# Patient Record
Sex: MALE | Race: BLACK OR AFRICAN AMERICAN | Employment: UNEMPLOYED | URBAN - METROPOLITAN AREA
[De-identification: names, ages, dates, MRNs, and addresses within clinical notes are randomized per-mention and may not be internally consistent; named-entity substitution may affect disease eponyms.]

---

## 2019-03-26 ENCOUNTER — HOSPITAL ENCOUNTER (EMERGENCY)
Age: 19
Discharge: HOME | End: 2019-03-26
Attending: EMERGENCY MEDICINE
Payer: COMMERCIAL

## 2019-03-26 ENCOUNTER — APPOINTMENT (OUTPATIENT)
Dept: GENERAL RADIOLOGY | Age: 19
End: 2019-03-26
Payer: COMMERCIAL

## 2019-03-26 VITALS
WEIGHT: 111 LBS | HEART RATE: 59 BPM | DIASTOLIC BLOOD PRESSURE: 50 MMHG | HEIGHT: 68 IN | RESPIRATION RATE: 18 BRPM | BODY MASS INDEX: 16.82 KG/M2 | SYSTOLIC BLOOD PRESSURE: 103 MMHG | TEMPERATURE: 98.6 F | OXYGEN SATURATION: 97 %

## 2019-03-26 DIAGNOSIS — S46.912A STRAIN OF LEFT SHOULDER, INITIAL ENCOUNTER: Primary | ICD-10-CM

## 2019-03-26 PROCEDURE — 73030 X-RAY EXAM OF SHOULDER: CPT

## 2019-03-26 PROCEDURE — 71045 X-RAY EXAM CHEST 1 VIEW: CPT

## 2019-03-26 PROCEDURE — 99283 EMERGENCY DEPT VISIT LOW MDM: CPT

## 2019-03-26 RX ORDER — NAPROXEN 500 MG/1
500 TABLET ORAL 2 TIMES DAILY
Qty: 30 TABLET | Refills: 0 | Status: SHIPPED | OUTPATIENT
Start: 2019-03-26 | End: 2019-04-10

## 2019-03-26 SDOH — HEALTH STABILITY: MENTAL HEALTH: HOW OFTEN DO YOU HAVE A DRINK CONTAINING ALCOHOL?: NEVER

## 2019-03-26 ASSESSMENT — PAIN DESCRIPTION - DESCRIPTORS: DESCRIPTORS: SHARP

## 2019-03-26 ASSESSMENT — PAIN SCALES - GENERAL: PAINLEVEL_OUTOF10: 8

## 2019-03-26 ASSESSMENT — PAIN DESCRIPTION - LOCATION: LOCATION: SHOULDER

## 2019-03-26 ASSESSMENT — PAIN DESCRIPTION - ORIENTATION: ORIENTATION: LEFT

## 2021-01-09 ENCOUNTER — HOSPITAL ENCOUNTER (EMERGENCY)
Age: 21
Discharge: HOME | End: 2021-01-09
Payer: COMMERCIAL

## 2021-01-09 VITALS
SYSTOLIC BLOOD PRESSURE: 96 MMHG | DIASTOLIC BLOOD PRESSURE: 64 MMHG | TEMPERATURE: 97 F | OXYGEN SATURATION: 99 % | HEART RATE: 75 BPM | RESPIRATION RATE: 18 BRPM

## 2021-01-09 DIAGNOSIS — J02.0 STREP PHARYNGITIS: Primary | ICD-10-CM

## 2021-01-09 LAB — STREP GRP A PCR: POSITIVE

## 2021-01-09 PROCEDURE — 99283 EMERGENCY DEPT VISIT LOW MDM: CPT

## 2021-01-09 PROCEDURE — 6370000000 HC RX 637 (ALT 250 FOR IP): Performed by: NURSE PRACTITIONER

## 2021-01-09 PROCEDURE — 87880 STREP A ASSAY W/OPTIC: CPT

## 2021-01-09 RX ORDER — PREDNISONE 20 MG/1
60 TABLET ORAL ONCE
Status: COMPLETED | OUTPATIENT
Start: 2021-01-09 | End: 2021-01-09

## 2021-01-09 RX ORDER — IBUPROFEN 800 MG/1
800 TABLET ORAL ONCE
Status: COMPLETED | OUTPATIENT
Start: 2021-01-09 | End: 2021-01-09

## 2021-01-09 RX ORDER — PREDNISONE 10 MG/1
TABLET ORAL
Qty: 20 TABLET | Refills: 0 | Status: SHIPPED | OUTPATIENT
Start: 2021-01-09 | End: 2021-01-19

## 2021-01-09 RX ORDER — AMOXICILLIN 250 MG/1
500 CAPSULE ORAL ONCE
Status: COMPLETED | OUTPATIENT
Start: 2021-01-09 | End: 2021-01-09

## 2021-01-09 RX ORDER — AMOXICILLIN 500 MG/1
500 CAPSULE ORAL 2 TIMES DAILY
Qty: 30 CAPSULE | Refills: 0 | Status: SHIPPED | OUTPATIENT
Start: 2021-01-09 | End: 2021-01-19

## 2021-01-09 RX ADMIN — IBUPROFEN 800 MG: 800 TABLET, FILM COATED ORAL at 21:44

## 2021-01-09 RX ADMIN — PREDNISONE 60 MG: 20 TABLET ORAL at 21:43

## 2021-01-09 RX ADMIN — AMOXICILLIN 500 MG: 250 CAPSULE ORAL at 21:44

## 2021-01-09 ASSESSMENT — PAIN SCALES - GENERAL: PAINLEVEL_OUTOF10: 7

## 2021-01-10 NOTE — ED PROVIDER NOTES
Independent   HPI:  1/9/21, Time: 9:00 PM TATE Mohr is a 23 y.o. male presenting to the ED for 3-day history of sore throat. Patient reports that her last 3 days he has had a sore throat as well as subjective chills. States that he was not exposed to anyone with strep that he is aware of. He denies taking any Motrin or Tylenol to assist with pain relief. Denies any throat tightening or drooling or change noted in voice quality. Patient reports that he does smoke. Patient otherwise denies chest pain, shortness of breath, abdominal pain as well as no noted nausea, vomiting or diarrhea  Review of Systems:   A complete review of systems was performed and pertinent positives and negatives are stated within HPI, all other systems reviewed and are negative.          --------------------------------------------- PAST HISTORY ---------------------------------------------  Past Medical History:  has no past medical history on file. Past Surgical History:  has no past surgical history on file. Social History:  reports that he has been smoking cigarettes. He has never used smokeless tobacco. He reports that he does not drink alcohol or use drugs. Family History: family history is not on file. The patients home medications have been reviewed. Allergies: Patient has no known allergies.     -------------------------------------------------- RESULTS -------------------------------------------------  All laboratory and radiology results have been personally reviewed by myself   LABS:  Results for orders placed or performed during the hospital encounter of 01/09/21   Strep Screen Group A Throat    Specimen: Throat   Result Value Ref Range    Strep Grp A PCR POSITIVE Negative       RADIOLOGY:  Interpreted by Radiologist.  No orders to display       ------------------------- NURSING NOTES AND VITALS REVIEWED ---------------------------   The nursing notes within the ED encounter and vital signs as providing specific details for the plan of care and counseling regarding the diagnosis and prognosis. Questions are answered at this time and they are agreeable with the plan.      --------------------------------- IMPRESSION AND DISPOSITION ---------------------------------    IMPRESSION  1. Strep pharyngitis        DISPOSITION  Disposition: Discharge to home  Patient condition is good      NOTE: This report was transcribed using voice recognition software.  Every effort was made to ensure accuracy; however, inadvertent computerized transcription errors may be present     Ramona Bernstein, HOLA - CNP  01/10/21 4394  ATTENDING PROVIDER ATTESTATION:     Supervising Physician, on-site, available for consultation, non-participatory in the evaluation or care of this patient       Sheryle Bison, MD  01/10/21 1972

## 2021-03-23 ENCOUNTER — HOSPITAL ENCOUNTER (EMERGENCY)
Age: 21
Discharge: HOME | End: 2021-03-23
Payer: COMMERCIAL

## 2021-03-23 VITALS
RESPIRATION RATE: 18 BRPM | OXYGEN SATURATION: 99 % | HEART RATE: 76 BPM | DIASTOLIC BLOOD PRESSURE: 58 MMHG | SYSTOLIC BLOOD PRESSURE: 126 MMHG | TEMPERATURE: 98.2 F

## 2021-03-23 DIAGNOSIS — J02.9 ACUTE PHARYNGITIS, UNSPECIFIED ETIOLOGY: Primary | ICD-10-CM

## 2021-03-23 LAB — STREP GRP A PCR: NEGATIVE

## 2021-03-23 PROCEDURE — 6360000002 HC RX W HCPCS: Performed by: NURSE PRACTITIONER

## 2021-03-23 PROCEDURE — 6370000000 HC RX 637 (ALT 250 FOR IP): Performed by: NURSE PRACTITIONER

## 2021-03-23 PROCEDURE — 87880 STREP A ASSAY W/OPTIC: CPT

## 2021-03-23 PROCEDURE — 99283 EMERGENCY DEPT VISIT LOW MDM: CPT

## 2021-03-23 PROCEDURE — 96372 THER/PROPH/DIAG INJ SC/IM: CPT

## 2021-03-23 RX ORDER — IBUPROFEN 800 MG/1
800 TABLET ORAL 2 TIMES DAILY PRN
Qty: 10 TABLET | Refills: 0 | Status: SHIPPED | OUTPATIENT
Start: 2021-03-23 | End: 2021-03-28

## 2021-03-23 RX ORDER — KETOROLAC TROMETHAMINE 30 MG/ML
30 INJECTION, SOLUTION INTRAMUSCULAR; INTRAVENOUS ONCE
Status: COMPLETED | OUTPATIENT
Start: 2021-03-23 | End: 2021-03-23

## 2021-03-23 RX ORDER — AMOXICILLIN 250 MG/1
500 CAPSULE ORAL ONCE
Status: COMPLETED | OUTPATIENT
Start: 2021-03-23 | End: 2021-03-23

## 2021-03-23 RX ORDER — AMOXICILLIN 500 MG/1
500 CAPSULE ORAL 2 TIMES DAILY
Qty: 20 CAPSULE | Refills: 0 | Status: SHIPPED | OUTPATIENT
Start: 2021-03-23 | End: 2021-04-02

## 2021-03-23 RX ADMIN — KETOROLAC TROMETHAMINE 30 MG: 30 INJECTION, SOLUTION INTRAMUSCULAR at 15:11

## 2021-03-23 RX ADMIN — AMOXICILLIN 500 MG: 250 CAPSULE ORAL at 16:06

## 2021-03-23 ASSESSMENT — PAIN SCALES - GENERAL: PAINLEVEL_OUTOF10: 6
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
## 2021-04-16 ENCOUNTER — HOSPITAL ENCOUNTER (EMERGENCY)
Age: 21
Discharge: HOME | End: 2021-04-16
Payer: COMMERCIAL

## 2021-04-16 VITALS
TEMPERATURE: 97.3 F | OXYGEN SATURATION: 98 % | DIASTOLIC BLOOD PRESSURE: 59 MMHG | SYSTOLIC BLOOD PRESSURE: 113 MMHG | HEIGHT: 69 IN | WEIGHT: 115 LBS | BODY MASS INDEX: 17.03 KG/M2 | RESPIRATION RATE: 16 BRPM | HEART RATE: 82 BPM

## 2021-04-16 DIAGNOSIS — J02.9 ACUTE PHARYNGITIS, UNSPECIFIED ETIOLOGY: Primary | ICD-10-CM

## 2021-04-16 PROCEDURE — 99283 EMERGENCY DEPT VISIT LOW MDM: CPT

## 2021-04-16 PROCEDURE — 6370000000 HC RX 637 (ALT 250 FOR IP): Performed by: NURSE PRACTITIONER

## 2021-04-16 RX ORDER — CEFDINIR 300 MG/1
300 CAPSULE ORAL 2 TIMES DAILY
Qty: 20 CAPSULE | Refills: 0 | Status: SHIPPED | OUTPATIENT
Start: 2021-04-16 | End: 2021-04-26

## 2021-04-16 RX ORDER — CEFDINIR 300 MG/1
300 CAPSULE ORAL ONCE
Status: COMPLETED | OUTPATIENT
Start: 2021-04-16 | End: 2021-04-16

## 2021-04-16 RX ADMIN — CEFDINIR 300 MG: 300 CAPSULE ORAL at 22:11

## 2021-04-16 ASSESSMENT — PAIN DESCRIPTION - DESCRIPTORS: DESCRIPTORS: SORE

## 2021-04-16 ASSESSMENT — PAIN DESCRIPTION - LOCATION: LOCATION: THROAT

## 2021-04-17 NOTE — ED PROVIDER NOTES
[address]  Department of Emergency Medicine   ED  Encounter Note  Admit Date/RoomTime: 2021  9:48 PM  ED Room: [facility]      NAME: Crystal Wilkerson  : 2000  MRN: [id number]     Chief Complaint:  Pharyngitis (X 1 week. )    History of Present Illness      Crystal Wilkerson is a 23 y.o. old male who presents to the emergency department by private vehicle, for persistent of bilateral sore throat pain, which occured 1 week(s) prior to arrival. Associated Signs & Symptoms: None Since onset the symptoms have been persistent. He is drinking plenty of fluids. Patient states he has had reoccurring episodes of strep pharyngitis this year. He states when he has had strep throat he is taking his antibiotics and his symptoms improved. States nothing makes his symptoms better or worse. He denies headache, fever, chills, chest pain, shortness of breath, cough, abdominal pain, nausea, vomiting, diarrhea, constipation, dysuria, rash. Exposed To: Streptococcus: unknown. Infectious Mononucleosis:  unknown. Symptoms:  Pain:  Yes. Muffled Voice:  No.                            Hoarse:  No.                            Difficulty with Secretions:  No.    Centor Score (MODIFIED) For Strep Pharyngitis:    Age 3-14 Years   no (0)     Age >44 Years   NO     Exudate or Swelling on Tonsils   yes (1)     Tender/Swollen Anterior Cervical Nodes   yes (1)     Fever? (T > 38°C, 100.4°F)   no (0)     Absence of Cough   yes (1)   TOTAL POINTS   3   Score of Zero = Probability of Strep Pharyngitis: 1% - 2.5%. ,   No further testing nor antibiotics. Score of 1 = Probability of Strep Pharyngitis: 5% - 10%. ,   No further testing nor antibiotics. Score of 2 = Probability of Strep Phar: 11% - 17%. Culture/test all, ATB's only for positive culture results. Score of 3 = Probability of Strep Phar: 28% - 35%.    Culture/test all, ATB's only for positive culture results  Score of 4 or 5 = Probability of Strep Pharyngitis: 51% - 53%. Treat empirically with antibiotics. ROS   Pertinent positives and negatives are stated within HPI, all other systems reviewed and are negative. Past Medical History:  has no past medical history on file. Surgical History:  has no past surgical history on file. Social History:  reports that he has been smoking cigarettes. He has never used smokeless tobacco. He reports that he does not drink alcohol or use drugs. Family History: family history is not on file. Allergies: Patient has no known allergies. Physical Exam   Oxygen Saturation Interpretation: Normal.        ED Triage Vitals   BP Temp Temp src Pulse Resp SpO2 Height Weight   04/16/21 2148 04/16/21 2142 -- 04/16/21 2142 04/16/21 2148 04/16/21 2142 04/16/21 2147 04/16/21 2147   (!) 113/59 97.3 °F (36.3 °C)  82 16 98 % 5' 9\" (1.753 m) 115 lb (52.2 kg)         Constitutional:  Alert, development consistent with age. .  Ears:  TMs without perforation, injection, or bulging. External canals clear without exudate. Throat: Airway Patent. moderate erythema, tonsillar hypertrophy 2+ and no exudates. Neck/Lymphatic:  Supple. There is Bilateral  anterior cervical node tenderness. respiratory:  Clear to auscultation and breath sounds equal.    CV: Regular rate and rhythm, normal heart sounds, without pathological murmurs, ectopy, gallops, or rubs. GI:  Abdomen Soft, nontender, good bowel sounds. No firm or pulsatile mass. Inegument:  No rashes, erythema present. Neurological:  Oriented. Motor functions intact. Lab / Imaging Results   (All laboratory and radiology results have been personally reviewed by myself)  Labs:  No results found for this visit on 04/16/21. Imaging: All Radiology results interpreted by Radiologist unless otherwise noted.   No orders to display       ED Course / Medical Decision Making     Medications cefdinir (OMNICEF) capsule 300 mg (300 mg Oral Given 4/16/21 2211)        Consult(s):   None    Procedure(s):   none    MDM:   Based on high suspicion for Strep as per history/physical findings, Rapid Strep/Throat Culture testing was not done  Pharyngitis is likely to  be Strep. Antibiotics are indicated at this time based on clinical presentation and physical findings. Not hypoxic, nothing to suggest pneumonia. Patient is well appearing, non toxic and appropriate for outpatient management. Plan of Care: Normal progression of disease discussed. All questions answered. Instruction provided in the use of fluids, vaporizer, acetaminophen, and other OTC medication for symptom control. Extra fluids  Analgesics as needed, dose reviewed. Follow up as needed should symptoms fail to improve. Prescription for Omnicef     Counseling:  I reviewed today's visit with the patient in addition to providing specific details for the plan of care and counseling regarding the diagnosis and prognosis. Questions are answered at this time and are agreeable with the plan. Assessment     1. Acute pharyngitis, unspecified etiology      Plan   Discharge home. Patient condition is good    New Medications     New Prescriptions    CEFDINIR (OMNICEF) 300 MG CAPSULE    Take 1 capsule by mouth 2 times daily for 10 days     Electronically signed by HOLA Toth CNP   DD: 4/16/21  **This report was transcribed using voice recognition software. Every effort was made to ensure accuracy; however, inadvertent computerized transcription errors may be present.   END OF ED PROVIDER NOTE       HOLA Guillen CNP  04/16/21 5909

## 2022-05-21 ENCOUNTER — APPOINTMENT (OUTPATIENT)
Dept: GENERAL RADIOLOGY | Age: 22
End: 2022-05-21
Payer: COMMERCIAL

## 2022-05-21 ENCOUNTER — HOSPITAL ENCOUNTER (EMERGENCY)
Age: 22
Discharge: HOME | End: 2022-05-21
Attending: EMERGENCY MEDICINE
Payer: COMMERCIAL

## 2022-05-21 VITALS
HEART RATE: 84 BPM | TEMPERATURE: 97.1 F | BODY MASS INDEX: 16.67 KG/M2 | OXYGEN SATURATION: 97 % | HEIGHT: 68 IN | RESPIRATION RATE: 16 BRPM | DIASTOLIC BLOOD PRESSURE: 78 MMHG | WEIGHT: 110 LBS | SYSTOLIC BLOOD PRESSURE: 126 MMHG

## 2022-05-21 DIAGNOSIS — R05.9 COUGH: ICD-10-CM

## 2022-05-21 DIAGNOSIS — J45.901 MODERATE ASTHMA WITH EXACERBATION, UNSPECIFIED WHETHER PERSISTENT: Primary | ICD-10-CM

## 2022-05-21 LAB
ALBUMIN SERPL-MCNC: 4.6 G/DL (ref 3.5–5.2)
ALP BLD-CCNC: 105 U/L (ref 40–129)
ALT SERPL-CCNC: 167 U/L (ref 0–40)
ANION GAP SERPL CALCULATED.3IONS-SCNC: 10 MMOL/L (ref 7–16)
AST SERPL-CCNC: 117 U/L (ref 0–39)
BASOPHILS ABSOLUTE: 0.02 E9/L (ref 0–0.2)
BASOPHILS RELATIVE PERCENT: 0.2 % (ref 0–2)
BILIRUB SERPL-MCNC: 0.6 MG/DL (ref 0–1.2)
BUN BLDV-MCNC: 8 MG/DL (ref 6–20)
CALCIUM SERPL-MCNC: 9.5 MG/DL (ref 8.6–10.2)
CHLORIDE BLD-SCNC: 101 MMOL/L (ref 98–107)
CO2: 29 MMOL/L (ref 22–29)
CREAT SERPL-MCNC: 0.9 MG/DL (ref 0.7–1.2)
EOSINOPHILS ABSOLUTE: 0.33 E9/L (ref 0.05–0.5)
EOSINOPHILS RELATIVE PERCENT: 3.1 % (ref 0–6)
GFR AFRICAN AMERICAN: >60
GFR NON-AFRICAN AMERICAN: >60 ML/MIN/1.73
GLUCOSE BLD-MCNC: 93 MG/DL (ref 74–99)
HCT VFR BLD CALC: 44 % (ref 37–54)
HEMOGLOBIN: 14.6 G/DL (ref 12.5–16.5)
IMMATURE GRANULOCYTES #: 0.04 E9/L
IMMATURE GRANULOCYTES %: 0.4 % (ref 0–5)
LYMPHOCYTES ABSOLUTE: 5.72 E9/L (ref 1.5–4)
LYMPHOCYTES RELATIVE PERCENT: 54.4 % (ref 20–42)
MCH RBC QN AUTO: 29.2 PG (ref 26–35)
MCHC RBC AUTO-ENTMCNC: 33.2 % (ref 32–34.5)
MCV RBC AUTO: 88 FL (ref 80–99.9)
MONOCYTES ABSOLUTE: 0.56 E9/L (ref 0.1–0.95)
MONOCYTES RELATIVE PERCENT: 5.3 % (ref 2–12)
NEUTROPHILS ABSOLUTE: 3.84 E9/L (ref 1.8–7.3)
NEUTROPHILS RELATIVE PERCENT: 36.6 % (ref 43–80)
PDW BLD-RTO: 13.8 FL (ref 11.5–15)
PLATELET # BLD: 265 E9/L (ref 130–450)
PMV BLD AUTO: 9.8 FL (ref 7–12)
POTASSIUM SERPL-SCNC: 4 MMOL/L (ref 3.5–5)
RBC # BLD: 5 E12/L (ref 3.8–5.8)
SODIUM BLD-SCNC: 140 MMOL/L (ref 132–146)
TOTAL PROTEIN: 7.6 G/DL (ref 6.4–8.3)
TROPONIN, HIGH SENSITIVITY: <6 NG/L (ref 0–11)
WBC # BLD: 10.5 E9/L (ref 4.5–11.5)

## 2022-05-21 PROCEDURE — 93005 ELECTROCARDIOGRAM TRACING: CPT | Performed by: NURSE PRACTITIONER

## 2022-05-21 PROCEDURE — 85025 COMPLETE CBC W/AUTO DIFF WBC: CPT

## 2022-05-21 PROCEDURE — 96374 THER/PROPH/DIAG INJ IV PUSH: CPT

## 2022-05-21 PROCEDURE — 94664 DEMO&/EVAL PT USE INHALER: CPT

## 2022-05-21 PROCEDURE — 80053 COMPREHEN METABOLIC PANEL: CPT

## 2022-05-21 PROCEDURE — 84484 ASSAY OF TROPONIN QUANT: CPT

## 2022-05-21 PROCEDURE — 71045 X-RAY EXAM CHEST 1 VIEW: CPT

## 2022-05-21 PROCEDURE — 2580000003 HC RX 258: Performed by: EMERGENCY MEDICINE

## 2022-05-21 PROCEDURE — 6360000002 HC RX W HCPCS: Performed by: NURSE PRACTITIONER

## 2022-05-21 PROCEDURE — 99285 EMERGENCY DEPT VISIT HI MDM: CPT

## 2022-05-21 PROCEDURE — 6370000000 HC RX 637 (ALT 250 FOR IP): Performed by: NURSE PRACTITIONER

## 2022-05-21 RX ORDER — METHYLPREDNISOLONE 4 MG/1
TABLET ORAL
Qty: 21 TABLET | Refills: 0 | Status: SHIPPED | OUTPATIENT
Start: 2022-05-21 | End: 2022-05-27

## 2022-05-21 RX ORDER — 0.9 % SODIUM CHLORIDE 0.9 %
1000 INTRAVENOUS SOLUTION INTRAVENOUS ONCE
Status: COMPLETED | OUTPATIENT
Start: 2022-05-21 | End: 2022-05-21

## 2022-05-21 RX ORDER — IPRATROPIUM BROMIDE AND ALBUTEROL SULFATE 2.5; .5 MG/3ML; MG/3ML
3 SOLUTION RESPIRATORY (INHALATION) ONCE
Status: COMPLETED | OUTPATIENT
Start: 2022-05-21 | End: 2022-05-21

## 2022-05-21 RX ORDER — METHYLPREDNISOLONE SODIUM SUCCINATE 125 MG/2ML
125 INJECTION, POWDER, LYOPHILIZED, FOR SOLUTION INTRAMUSCULAR; INTRAVENOUS ONCE
Status: COMPLETED | OUTPATIENT
Start: 2022-05-21 | End: 2022-05-21

## 2022-05-21 RX ORDER — DOXYCYCLINE HYCLATE 100 MG
100 TABLET ORAL 2 TIMES DAILY
Qty: 20 TABLET | Refills: 0 | Status: SHIPPED | OUTPATIENT
Start: 2022-05-21 | End: 2022-05-31

## 2022-05-21 RX ORDER — ALBUTEROL SULFATE 90 UG/1
2 AEROSOL, METERED RESPIRATORY (INHALATION) EVERY 4 HOURS PRN
Qty: 18 G | Refills: 1 | Status: SHIPPED | OUTPATIENT
Start: 2022-05-21 | End: 2023-05-21

## 2022-05-21 RX ADMIN — IPRATROPIUM BROMIDE AND ALBUTEROL SULFATE 3 AMPULE: 2.5; .5 SOLUTION RESPIRATORY (INHALATION) at 03:29

## 2022-05-21 RX ADMIN — SODIUM CHLORIDE 1000 ML: 9 INJECTION, SOLUTION INTRAVENOUS at 05:29

## 2022-05-21 RX ADMIN — METHYLPREDNISOLONE SODIUM SUCCINATE 125 MG: 125 INJECTION, POWDER, FOR SOLUTION INTRAMUSCULAR; INTRAVENOUS at 03:42

## 2022-05-21 ASSESSMENT — PAIN DESCRIPTION - ORIENTATION: ORIENTATION: MID

## 2022-05-21 ASSESSMENT — PAIN SCALES - GENERAL: PAINLEVEL_OUTOF10: 7

## 2022-05-21 ASSESSMENT — PAIN DESCRIPTION - ONSET: ONSET: SUDDEN

## 2022-05-21 ASSESSMENT — PAIN DESCRIPTION - FREQUENCY: FREQUENCY: CONTINUOUS

## 2022-05-21 ASSESSMENT — PAIN DESCRIPTION - DESCRIPTORS: DESCRIPTORS: SHARP

## 2022-05-21 ASSESSMENT — PAIN DESCRIPTION - PAIN TYPE: TYPE: ACUTE PAIN

## 2022-05-21 ASSESSMENT — PAIN - FUNCTIONAL ASSESSMENT: PAIN_FUNCTIONAL_ASSESSMENT: 0-10

## 2022-05-21 ASSESSMENT — PAIN DESCRIPTION - LOCATION: LOCATION: CHEST
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
## 2022-05-21 NOTE — ED PROVIDER NOTES
HPI:  5/21/22, Time: 3:38 AM EDT         Francoise Andrew is a 19 y.o. male presenting to the ED for shortness of breath, beginning hours ago. The complaint has been persistent, moderate in severity, and worsened by nothing. Patient reporting feeling short of breath and reports chest discomfort. Patient reporting cough for the last 4 days. Patient reports nonproductive patient reporting no fever. Patient reporting no runny nose. There is no abdominal pain or vomiting there is no leg pain. Patient reports history of asthma. Patient reporting no weakness or dizziness. Patient reporting no headache. ROS:   Pertinent positives and negatives are stated within HPI, all other systems reviewed and are negative.  --------------------------------------------- PAST HISTORY ---------------------------------------------  Past Medical History:  has no past medical history on file. Past Surgical History:  has no past surgical history on file. Social History:  reports that he has been smoking cigarettes. He has never used smokeless tobacco. He reports that he does not drink alcohol and does not use drugs. Family History: family history is not on file. The patients home medications have been reviewed. Allergies: Patient has no known allergies. ---------------------------------------------------PHYSICAL EXAM--------------------------------------    Constitutional/General: Alert and oriented x3, well appearing, non toxic in NAD  Head: Normocephalic and atraumatic  Eyes: PERRL, EOMI  Mouth: Oropharynx clear, handling secretions, no trismus  Neck: Supple, full ROM, non tender to palpation in the midline, no stridor, no crepitus, no meningeal signs  Pulmonary: Lungs diminished  breath sounds bilaterally mild distress  Cardiovascular:  Regular rate. Regular rhythm. No murmurs, gallops, or rubs. 2+ distal pulses  Chest: no chest wall tenderness  Abdomen: Soft. Non tender. Non distended. +BS.   No rebound, guarding, or rigidity. No pulsatile masses appreciated. Musculoskeletal: Moves all extremities x 4. Warm and well perfused, no clubbing, cyanosis, or edema. Capillary refill <3 seconds  Skin: warm and dry. No rashes. Neurologic: GCS 15, CN 2-12 grossly intact, no focal deficits, symmetric strength 5/5 in the upper and lower extremities bilaterally  Psych: Normal Affect    -------------------------------------------------- RESULTS -------------------------------------------------  I have personally reviewed all laboratory and imaging results for this patient. Results are listed below.      LABS:  Results for orders placed or performed during the hospital encounter of 05/21/22   Troponin   Result Value Ref Range    Troponin, High Sensitivity <6 0 - 11 ng/L   CBC with Auto Differential   Result Value Ref Range    WBC 10.5 4.5 - 11.5 E9/L    RBC 5.00 3.80 - 5.80 E12/L    Hemoglobin 14.6 12.5 - 16.5 g/dL    Hematocrit 44.0 37.0 - 54.0 %    MCV 88.0 80.0 - 99.9 fL    MCH 29.2 26.0 - 35.0 pg    MCHC 33.2 32.0 - 34.5 %    RDW 13.8 11.5 - 15.0 fL    Platelets 918 399 - 872 E9/L    MPV 9.8 7.0 - 12.0 fL    Neutrophils % 36.6 (L) 43.0 - 80.0 %    Immature Granulocytes % 0.4 0.0 - 5.0 %    Lymphocytes % 54.4 (H) 20.0 - 42.0 %    Monocytes % 5.3 2.0 - 12.0 %    Eosinophils % 3.1 0.0 - 6.0 %    Basophils % 0.2 0.0 - 2.0 %    Neutrophils Absolute 3.84 1.80 - 7.30 E9/L    Immature Granulocytes # 0.04 E9/L    Lymphocytes Absolute 5.72 (H) 1.50 - 4.00 E9/L    Monocytes Absolute 0.56 0.10 - 0.95 E9/L    Eosinophils Absolute 0.33 0.05 - 0.50 E9/L    Basophils Absolute 0.02 0.00 - 0.20 E9/L   Comprehensive Metabolic Panel   Result Value Ref Range    Sodium 140 132 - 146 mmol/L    Potassium 4.0 3.5 - 5.0 mmol/L    Chloride 101 98 - 107 mmol/L    CO2 29 22 - 29 mmol/L    Anion Gap 10 7 - 16 mmol/L    Glucose 93 74 - 99 mg/dL    BUN 8 6 - 20 mg/dL    CREATININE 0.9 0.7 - 1.2 mg/dL    GFR Non-African American >60 >=60 mL/min/1.73    GFR African American >60     Calcium 9.5 8.6 - 10.2 mg/dL    Total Protein 7.6 6.4 - 8.3 g/dL    Albumin 4.6 3.5 - 5.2 g/dL    Total Bilirubin 0.6 0.0 - 1.2 mg/dL    Alkaline Phosphatase 105 40 - 129 U/L     (H) 0 - 40 U/L     (H) 0 - 39 U/L   EKG 12 Lead   Result Value Ref Range    Ventricular Rate 68 BPM    Atrial Rate 68 BPM    P-R Interval 144 ms    QRS Duration 92 ms    Q-T Interval 368 ms    QTc Calculation (Bazett) 391 ms    P Axis 56 degrees    R Axis 78 degrees    T Axis 64 degrees       RADIOLOGY:  Interpreted by Radiologist.  XR CHEST PORTABLE   Final Result   Trace basal opacities may represent areas of mild atelectasis. Early   infiltrates less likely. PA and lateral views would be useful for further   assessment, if symptoms persist.               EKG:  This EKG is signed and interpreted by me. Rate: 68  Rhythm: Sinus  Interpretation: non-specific EKG  Comparison: stable as compared to patient's most recent EKG        ------------------------- NURSING NOTES AND VITALS REVIEWED ---------------------------   The nursing notes within the ED encounter and vital signs as below have been reviewed by myself. BP (!) 119/56   Pulse 87   Temp 97.1 °F (36.2 °C) (Oral)   Resp 16   Ht 5' 8\" (1.727 m)   Wt 110 lb (49.9 kg)   SpO2 97%   BMI 16.73 kg/m²   Oxygen Saturation Interpretation: Normal    The patients available past medical records and past encounters were reviewed.         ------------------------------ ED COURSE/MEDICAL DECISION MAKING----------------------  Medications   0.9 % sodium chloride bolus (1,000 mLs IntraVENous New Bag 5/21/22 6647)   methylPREDNISolone sodium (SOLU-MEDROL) injection 125 mg (125 mg IntraVENous Given 5/21/22 0171)   ipratropium-albuterol (DUONEB) nebulizer solution 3 ampule (3 ampules Inhalation Given 5/21/22 0329)             Medical Decision Making:      Presenting here because of shortness of breath started short time prior arrival.  Patient reporting wheezing feeling chest discomfort. Patient has had nonproductive cough for last 4 days. Patient reporting no fever. He reports no runny nose he does have a history of asthma. Patient arrived here lungs are diminished. Patient was given breathing treatments and steroids. Patient significantly improved on recheck. Patient's pulse ox in the upper 90s. Patient ambulated without difficulty did not desaturate. Patient's heart rate had come down as well as blood pressure simply improved. Patient will be discharged home. Patient was made aware of findings as well as x-ray findings he will be placed on antibiotics as well. Re-Evaluations:             Patient reevaluated and made aware of findings and plan. Patient will be discharged home. Vital signs significantly improved. Patient lungs are clear he has no hypoxia patient no respiratory distress. Consultations:                 Critical Care: This patient's ED course included: a personal history and physicial eaxmination    This patient has been closely monitored during their ED course. Counseling: The emergency provider has spoken with the patient and discussed todays results, in addition to providing specific details for the plan of care and counseling regarding the diagnosis and prognosis. Questions are answered at this time and they are agreeable with the plan.       --------------------------------- IMPRESSION AND DISPOSITION ---------------------------------    IMPRESSION  1. Moderate asthma with exacerbation, unspecified whether persistent    2. Cough        DISPOSITION  Disposition: Discharge home  Patient condition is stable        NOTE: This report was transcribed using voice recognition software.  Every effort was made to ensure accuracy; however, inadvertent computerized transcription errors may be present          Allison Marquez MD  05/21/22 9014 [address], [place]  05/21/22 6467

## 2022-05-23 LAB
EKG ATRIAL RATE: 68 BPM
EKG P AXIS: 56 DEGREES
EKG P-R INTERVAL: 144 MS
EKG Q-T INTERVAL: 368 MS
EKG QRS DURATION: 92 MS
EKG QTC CALCULATION (BAZETT): 391 MS
EKG R AXIS: 78 DEGREES
EKG T AXIS: 64 DEGREES
EKG VENTRICULAR RATE: 68 BPM

## 2022-08-12 ENCOUNTER — HOSPITAL ENCOUNTER (EMERGENCY)
Age: 22
Discharge: HOME | End: 2022-08-12
Payer: COMMERCIAL

## 2022-08-12 VITALS
WEIGHT: 110 LBS | RESPIRATION RATE: 16 BRPM | HEART RATE: 71 BPM | OXYGEN SATURATION: 100 % | TEMPERATURE: 97.8 F | SYSTOLIC BLOOD PRESSURE: 112 MMHG | BODY MASS INDEX: 16.73 KG/M2 | DIASTOLIC BLOOD PRESSURE: 60 MMHG

## 2022-08-12 DIAGNOSIS — N34.2 URETHRITIS: Primary | ICD-10-CM

## 2022-08-12 LAB
BACTERIA: ABNORMAL /HPF
BILIRUBIN URINE: NEGATIVE
BLOOD, URINE: ABNORMAL
CLARITY: ABNORMAL
COLOR: YELLOW
EPITHELIAL CELLS, UA: ABNORMAL /HPF
GLUCOSE URINE: NEGATIVE MG/DL
KETONES, URINE: NEGATIVE MG/DL
LEUKOCYTE ESTERASE, URINE: ABNORMAL
NITRITE, URINE: NEGATIVE
PH UA: 7 (ref 5–9)
PROTEIN UA: ABNORMAL MG/DL
RBC UA: ABNORMAL /HPF (ref 0–2)
SPECIFIC GRAVITY UA: 1.01 (ref 1–1.03)
UROBILINOGEN, URINE: 1 E.U./DL
WBC UA: >20 /HPF (ref 0–5)

## 2022-08-12 PROCEDURE — 87591 N.GONORRHOEAE DNA AMP PROB: CPT

## 2022-08-12 PROCEDURE — 2500000003 HC RX 250 WO HCPCS

## 2022-08-12 PROCEDURE — 96372 THER/PROPH/DIAG INJ SC/IM: CPT

## 2022-08-12 PROCEDURE — 6360000002 HC RX W HCPCS: Performed by: NURSE PRACTITIONER

## 2022-08-12 PROCEDURE — 81001 URINALYSIS AUTO W/SCOPE: CPT

## 2022-08-12 PROCEDURE — 6370000000 HC RX 637 (ALT 250 FOR IP): Performed by: NURSE PRACTITIONER

## 2022-08-12 PROCEDURE — 87491 CHLMYD TRACH DNA AMP PROBE: CPT

## 2022-08-12 PROCEDURE — 99284 EMERGENCY DEPT VISIT MOD MDM: CPT

## 2022-08-12 RX ORDER — CEFTRIAXONE 1 G/1
500 INJECTION, POWDER, FOR SOLUTION INTRAMUSCULAR; INTRAVENOUS ONCE
Status: COMPLETED | OUTPATIENT
Start: 2022-08-12 | End: 2022-08-12

## 2022-08-12 RX ORDER — AZITHROMYCIN 250 MG/1
1000 TABLET, FILM COATED ORAL ONCE
Status: COMPLETED | OUTPATIENT
Start: 2022-08-12 | End: 2022-08-12

## 2022-08-12 RX ORDER — DOXYCYCLINE HYCLATE 100 MG
100 TABLET ORAL 2 TIMES DAILY
Qty: 20 TABLET | Refills: 0 | Status: SHIPPED | OUTPATIENT
Start: 2022-08-12 | End: 2022-08-22

## 2022-08-12 RX ORDER — LIDOCAINE HYDROCHLORIDE 10 MG/ML
INJECTION, SOLUTION EPIDURAL; INFILTRATION; INTRACAUDAL; PERINEURAL
Status: COMPLETED
Start: 2022-08-12 | End: 2022-08-12

## 2022-08-12 RX ADMIN — LIDOCAINE HYDROCHLORIDE 5 ML: 10 INJECTION, SOLUTION EPIDURAL; INFILTRATION; INTRACAUDAL; PERINEURAL at 15:27

## 2022-08-12 RX ADMIN — AZITHROMYCIN MONOHYDRATE 1000 MG: 250 TABLET ORAL at 15:25

## 2022-08-12 RX ADMIN — CEFTRIAXONE SODIUM 500 MG: 1 INJECTION, POWDER, FOR SOLUTION INTRAMUSCULAR; INTRAVENOUS at 15:25

## 2022-08-12 ASSESSMENT — PAIN - FUNCTIONAL ASSESSMENT: PAIN_FUNCTIONAL_ASSESSMENT: NONE - DENIES PAIN

## 2022-08-12 NOTE — ED PROVIDER NOTES
[address]  Department of Emergency Medicine   ED  Encounter Note  Admit Date/RoomTime: 2022  2:15 PM  ED Room: [facility]    NAME: Gisell Granger  : 2000  MRN: [id number]     Chief Complaint:  Exposure to STD (Burning with urination and penile swelling)    History of Present Illness      Gisell Granger is a 19 y.o. old male who presents to the emergency department by private vehicle, for possible exposure to possible STD with dysuria and penile discharge, which occured a few day(s) prior to arrival.  Since exposure/onset there has been no change of symptoms. He denies any genital rash, genital ulcers, scrotal mass, scrotal pain, fever, abdominal pain, back pain, or hematuria. His prior STD history: No prior history of sexually transmitted disease. ROS   Pertinent positives and negatives are stated within HPI, all other systems reviewed and are negative. Past Medical History:  has no past medical history on file. Surgical History:  has no past surgical history on file. Social History:  reports that he has been smoking cigarettes. He has never used smokeless tobacco. He reports that he does not drink alcohol and does not use drugs. Family History: family history is not on file. Allergies: Patient has no known allergies. Physical Exam   Oxygen Saturation Interpretation: Normal.        ED Triage Vitals   BP Temp Temp src Heart Rate Resp SpO2 Height Weight   22 1419 22 1406 -- 22 1406 22 1406 22 1406 -- 22 1417   112/60 97.8 °F (36.6 °C)  71 16 100 %  110 lb (49.9 kg)         Constitutional:  Alert, development consistent with age. HEENT:  NC/NT. Airway patent  Neck:  Normal ROM. Supple. Respiratory:  Lungs Clear to auscultation and breath sounds equal.  CV:  Regular rate and rhythm, normal heart sounds, without pathological murmurs, ectopy, gallops, or rubs. GI:  AbdomenSoft, nontender, good bowel sounds. No firm or pulsatile mass. : Genital / Rectal examination deferred or declined by patient  Integument:  Normal turgor. Warm, dry, without visible rash, unless noted elsewhere. Lymphatics: No lymphangitis or adenopathy noted. Neurological:  Orientation age-appropriate. Motor functions intact. Lab / Imaging Results   (All laboratory and radiology results have been personally reviewed by myself)  Labs:  Results for orders placed or performed during the hospital encounter of 08/12/22   C.trachomatis N.gonorrhoeae DNA, Urine    Specimen: Urine   Result Value Ref Range    Source Urine    Urinalysis with Microscopic   Result Value Ref Range    Color, UA Yellow Straw/Yellow    Clarity, UA SL CLOUDY Clear    Glucose, Ur Negative Negative mg/dL    Bilirubin Urine Negative Negative    Ketones, Urine Negative Negative mg/dL    Specific Gravity, UA 1.015 1.005 - 1.030    Blood, Urine TRACE-INTACT Negative    pH, UA 7.0 5.0 - 9.0    Protein, UA TRACE Negative mg/dL    Urobilinogen, Urine 1.0 <2.0 E.U./dL    Nitrite, Urine Negative Negative    Leukocyte Esterase, Urine MODERATE (A) Negative    WBC, UA >20 (A) 0 - 5 /HPF    RBC, UA 1-3 0 - 2 /HPF    Epithelial Cells, UA NONE SEEN /HPF    Bacteria, UA FEW (A) None Seen /HPF     Imaging: All Radiology results interpreted by Radiologist unless otherwise noted. No orders to display     ED Course / Medical Decision Making     Medications   cefTRIAXone (ROCEPHIN) injection 500 mg (has no administration in time range)   azithromycin (ZITHROMAX) tablet 1,000 mg (has no administration in time range)   lidocaine PF 1 % injection (has no administration in time range)        Consult(s):   None    Procedure(s):   None    Medical Decision Making:    Plan to obtain cultures and treat for suspected STD and provide outpatient follow-up instructions. Patient educated on receiving results on MyChart as well as safe sex practices and outpatient follow-up as needed.   Patient was educated on signs and symptoms which require emergent re-evaluation. Plan of Care/Counseling:  HOLA King CNP reviewed today's visit with the patient in addition to providing specific details for the plan of care and counseling regarding the diagnosis and prognosis. Questions are answered at this time and are agreeable with the plan. Assessment     1. Urethritis      Plan   Discharged home. Patient condition is good    New Medications     New Prescriptions    DOXYCYCLINE HYCLATE (VIBRA-TABS) 100 MG TABLET    Take 1 tablet by mouth in the morning and 1 tablet before bedtime. Do all this for 10 days. Electronically signed by HOLA King CNP   DD: 8/12/22  **This report was transcribed using voice recognition software. Every effort was made to ensure accuracy; however, inadvertent computerized transcription errors may be present.   END OF ED PROVIDER NOTE       HOLA Villegas CNP  08/12/22 1520

## 2022-08-16 LAB
C. TRACHOMATIS DNA ,URINE: ABNORMAL
N. GONORRHOEAE DNA, URINE: ABNORMAL
SOURCE: ABNORMAL